# Patient Record
Sex: FEMALE | Race: WHITE
[De-identification: names, ages, dates, MRNs, and addresses within clinical notes are randomized per-mention and may not be internally consistent; named-entity substitution may affect disease eponyms.]

---

## 2018-08-20 ENCOUNTER — HOSPITAL ENCOUNTER (EMERGENCY)
Dept: HOSPITAL 45 - C.EDB | Age: 33
Discharge: HOME | End: 2018-08-20
Payer: COMMERCIAL

## 2018-08-20 VITALS — OXYGEN SATURATION: 97 %

## 2018-08-20 VITALS
WEIGHT: 293 LBS | BODY MASS INDEX: 44.41 KG/M2 | WEIGHT: 293 LBS | BODY MASS INDEX: 44.41 KG/M2 | HEIGHT: 67.99 IN | HEIGHT: 67.99 IN

## 2018-08-20 VITALS
TEMPERATURE: 97.52 F | OXYGEN SATURATION: 98 % | DIASTOLIC BLOOD PRESSURE: 89 MMHG | HEART RATE: 91 BPM | SYSTOLIC BLOOD PRESSURE: 152 MMHG

## 2018-08-20 DIAGNOSIS — Z96.41: ICD-10-CM

## 2018-08-20 DIAGNOSIS — R07.9: Primary | ICD-10-CM

## 2018-08-20 LAB
BASOPHILS # BLD: 0.02 K/UL (ref 0–0.2)
BASOPHILS NFR BLD: 0.2 %
BUN SERPL-MCNC: 10 MG/DL (ref 7–18)
CALCIUM SERPL-MCNC: 9 MG/DL (ref 8.5–10.1)
CO2 SERPL-SCNC: 25 MMOL/L (ref 21–32)
CREAT SERPL-MCNC: 0.65 MG/DL (ref 0.6–1.2)
EOS ABS #: 0.24 K/UL (ref 0–0.5)
EOSINOPHIL NFR BLD AUTO: 191 K/UL (ref 130–400)
GLUCOSE SERPL-MCNC: 98 MG/DL (ref 70–99)
HCT VFR BLD CALC: 42.9 % (ref 37–47)
HGB BLD-MCNC: 14.7 G/DL (ref 12–16)
IG#: 0.07 K/UL (ref 0–0.02)
IMM GRANULOCYTES NFR BLD AUTO: 18.8 %
LYMPHOCYTES # BLD: 1.73 K/UL (ref 1.2–3.4)
MCH RBC QN AUTO: 28.8 PG (ref 25–34)
MCHC RBC AUTO-ENTMCNC: 34.3 G/DL (ref 32–36)
MCV RBC AUTO: 84.1 FL (ref 80–100)
MONO ABS #: 0.58 K/UL (ref 0.11–0.59)
MONOCYTES NFR BLD: 6.3 %
NEUT ABS #: 6.57 K/UL (ref 1.4–6.5)
NEUTROPHILS # BLD AUTO: 2.6 %
NEUTROPHILS NFR BLD AUTO: 71.3 %
PMV BLD AUTO: 9.7 FL (ref 7.4–10.4)
POTASSIUM SERPL-SCNC: 3.6 MMOL/L (ref 3.5–5.1)
RED CELL DISTRIBUTION WIDTH CV: 13.3 % (ref 11.5–14.5)
RED CELL DISTRIBUTION WIDTH SD: 40.8 FL (ref 36.4–46.3)
SODIUM SERPL-SCNC: 140 MMOL/L (ref 136–145)
WBC # BLD AUTO: 9.21 K/UL (ref 4.8–10.8)

## 2018-08-20 NOTE — DIAGNOSTIC IMAGING REPORT
CHEST 2 VIEWS ROUTINE



CLINICAL HISTORY: 33 years-old Female presenting with cp. 



TECHNIQUE: PA and lateral views of the chest were obtained.



COMPARISON: None.



FINDINGS:

Cardiomediastinal silhouette normal. Lungs and pleural spaces clear. Osseous

structures normal. Upper abdomen normal.



IMPRESSION:

1.  No acute cardiopulmonary disease.







Electronically signed by:  Petr Franco M.D.

8/20/2018 6:20 PM



Dictated Date/Time:  8/20/2018 6:19 PM

## 2018-08-20 NOTE — EMERGENCY ROOM VISIT NOTE
History


Report prepared by Merrillibvanda:  Danielle D'Amico


Under the Supervision of:  Dr. Justin Ervin M.D.


First contact with patient:  17:18


Chief Complaint:  CHEST PAIN


Stated Complaint:  CHEST DISCOMFORT NOT FEELING WELL





History of Present Illness


The patient is a 33 year old female who presents to the Emergency Room with 

complaints of waxing and waning chest pain that onset today at 1530. She 

describes this as a dull pain in the center of the chest. The patient states 

that she was on her way home from work and started having chest discomfort. She 

notes that it subsided, but returned after she got home and ate food. She 

states that she was shaky, but notes that she did not eat lunch. She denies 

shortness of breath. The patient states that she has been drinking water 

throughout the day. The patient notes that her blood pressure was slightly up. 

The patient denies smoking, drug use, recent travel, traumas, and any chance of 

being pregnant. She also denies taking any birth control or estrogen creams. 

She states that she has a family of hypertension and heart disease.





   Source of History:  patient


   Onset:  today at 1530


   Position:  chest


   Quality:  dull


   Timing:  waxes/wanes


   Associated Symptoms:  No SOB


Note:


The patient complains of being shaky.





Review of Systems


See HPI for pertinent positives and negatives.  A total of ten systems were 

reviewed and were otherwise negative.





Family History





FHx: heart disease


FHx: hypertension





Social History


Smoking Status:  Never Smoker


Drug Use:  none





Allergies


Coded Allergies:  


     No Known Allergies (Unverified , 8/20/18)





Physical Exam


Vital Signs











  Date Time  Temp Pulse Resp B/P (MAP) Pulse Ox O2 Delivery O2 Flow Rate FiO2


 


8/20/18 18:14     97 Room Air  


 


8/20/18 18:14     97 Room Air  


 


8/20/18 18:13  89 16 153/97 98 Room Air  


 


8/20/18 17:34  96      


 


8/20/18 17:16 36.4 97 18 167/84 99 Room Air  











Physical Exam


Physical Exam 


GENERAL:  She is oriented to person, place, and time. She appears well-

developed and well-nourished. She does not appear distressed. There is a normal 

insulin pump in place.


HENT:  Exam performed. 


   Head:  Normocephalic and atraumatic. 


   Right Ear:  External ear normal. No mastoid tenderness. 


   Left Ear: External ear normal. No mastoid tenderness. 


   Mouth/Throat:  The oropharynx is clear and moist. No trismus in the jaw. No 

dental abscesses or uvula swelling. No oropharyngeal exudate or tonsillar 

abscesses.


EYES: Conjunctivae and EOM are normal. Pupils are equal, round, and reactive to 

light. Right eye exhibits no discharge. Left eye exhibits no discharge. No 

scleral icterus.


NECK: Normal range of motion. Neck supple. No JVD present. No spinous process 

tenderness present. No carotid bruit present. No rigidity. No tracheal 

deviation and normal range of motion present. No Brudzinski's sign and no Kernig

's sign noted.


CV: Normal rate, regular rhythm, normal heart sounds and intact distal pulses. 

There is no peripheral edema. Palpable radial pulses bue.


PULM/CHEST:  Effort normal and breath sounds normal. No respiratory distress. 

No stridor. She has no wheezes. She has no rales. 


   Chest Wall:  She exhibits no tenderness.


ABD: The abdomen is soft. Bowel sounds are normal. She has no distension. No 

mass is present. There is no tenderness. There is no rebound, no guarding, no 

Black's sign and no tenderness at McBurney's point. Rovsig negative


MUSC/SKEL: Normal range of motion. There is no peripheral edema, tenderness or 

deformity.


LYMPH: No cervical adenopathy.


NEURO: She is alert and oriented to person, place, and time. She has normal 

strength. No cranial nerve deficit or sensory deficit. Coordination and gait 

normal. GCS eye subscore is 4. GCS verbal subscore is 5. GCS motor subscore is 

6. Cerebellar tests wnl.


SKIN: Skin is warm and dry. She is not diaphoretic.


PSYCH: She has a normal mood and affect. Behavior is normal. Judgment and 

thought content normal.





Medical Decision & Procedures


ER Provider


Diagnostic Interpretation:


Radiology results as stated below per my review and radiologist interpretation: 





CHEST 2 VIEWS ROUTINE





CLINICAL HISTORY: 33 years-old Female presenting with cp. 





TECHNIQUE: PA and lateral views of the chest were obtained.





COMPARISON: None.





FINDINGS:


Cardiomediastinal silhouette normal. Lungs and pleural spaces clear. Osseous


structures normal. Upper abdomen normal.





IMPRESSION:


1.  No acute cardiopulmonary disease.











Electronically signed by:  Petr Franco M.D.


8/20/2018 6:20 PM





Dictated Date/Time:  8/20/2018 6:19 PM





Laboratory Results


8/20/18 17:50








Red Blood Count 5.10, Mean Corpuscular Volume 84.1, Mean Corpuscular Hemoglobin 

28.8, Mean Corpuscular Hemoglobin Concent 34.3, Mean Platelet Volume 9.7, 

Neutrophils (%) (Auto) 71.3, Lymphocytes (%) (Auto) 18.8, Monocytes (%) (Auto) 

6.3, Eosinophils (%) (Auto) 2.6, Basophils (%) (Auto) 0.2, Neutrophils # (Auto) 

6.57, Lymphocytes # (Auto) 1.73, Monocytes # (Auto) 0.58, Eosinophils # (Auto) 

0.24, Basophils # (Auto) 0.02





8/20/18 17:50

















Test


  8/20/18


17:50 8/20/18


20:58


 


White Blood Count


  9.21 K/uL


(4.8-10.8) 


 


 


Red Blood Count


  5.10 M/uL


(4.2-5.4) 


 


 


Hemoglobin


  14.7 g/dL


(12.0-16.0) 


 


 


Hematocrit 42.9 % (37-47)  


 


Mean Corpuscular Volume


  84.1 fL


() 


 


 


Mean Corpuscular Hemoglobin


  28.8 pg


(25-34) 


 


 


Mean Corpuscular Hemoglobin


Concent 34.3 g/dl


(32-36) 


 


 


Platelet Count


  191 K/uL


(130-400) 


 


 


Mean Platelet Volume


  9.7 fL


(7.4-10.4) 


 


 


Neutrophils (%) (Auto) 71.3 %  


 


Lymphocytes (%) (Auto) 18.8 %  


 


Monocytes (%) (Auto) 6.3 %  


 


Eosinophils (%) (Auto) 2.6 %  


 


Basophils (%) (Auto) 0.2 %  


 


Neutrophils # (Auto)


  6.57 K/uL


(1.4-6.5) 


 


 


Lymphocytes # (Auto)


  1.73 K/uL


(1.2-3.4) 


 


 


Monocytes # (Auto)


  0.58 K/uL


(0.11-0.59) 


 


 


Eosinophils # (Auto)


  0.24 K/uL


(0-0.5) 


 


 


Basophils # (Auto)


  0.02 K/uL


(0-0.2) 


 


 


RDW Standard Deviation


  40.8 fL


(36.4-46.3) 


 


 


RDW Coefficient of Variation


  13.3 %


(11.5-14.5) 


 


 


Immature Granulocyte % (Auto) 0.8 %  


 


Immature Granulocyte # (Auto)


  0.07 K/uL


(0.00-0.02) 


 


 


D-Dimer


  720 ug/L FEU


(0-500) 


 


 


Anion Gap


  8.0 mmol/L


(3-11) 


 


 


Est Creatinine Clear Calc


Drug Dose 187.2 ml/min 


  


 


 


Estimated GFR (


American) 135.2 


  


 


 


Estimated GFR (Non-


American 116.7 


  


 


 


BUN/Creatinine Ratio 15.5 (10-20)  


 


Calcium Level


  9.0 mg/dl


(8.5-10.1) 


 


 


Troponin I


  


  < 0.015 ng/ml


(0-0.045)





Laboratory results reviewed by me





Medications Administered











 Medications


  (Trade)  Dose


 Ordered  Sig/Sy


 Route  Start Time


 Stop Time Status Last Admin


Dose Admin


 


 Aspirin


  (Aspirin Chew)  324 mg  NOW  STAT


 PO  8/20/18 17:34


 8/20/18 17:35 DC 8/20/18 17:51


324 MG











ECG Per My Interpretation


Rate (beats per minute):  96


Rhythm:  sinus rhythm


Findings:  other (IA, QR, and QTC within normal limits. No ST segment elevation 

or depression. P waveinversion in lead 3 only. )





ED Course


1719: The patient was evaluated in room A10. A complete history and physical 

exam was performed.





1734: Ordered Aspirin 324 mg PO.





2042: Vital signs stable.  Physical exam within normal limits.  Patient states 

she has no chest pain at this time.  CTA of chest is negative. Will conduct 

troponin. If it is negative, she will be discharged. Her and her family member 

at her beside are in agreement of plan. 





2139: Vital signs stable.  Repeat troponin negative.  Patient will be 

discharged to follow-up with her PCP. DISCHARGE - Plan of care discussed with 

patient and questions answered. The patient was given both verbal and printed 

discharge instructions. The patient verbalized understanding and ability to 

comply. The patient is to seek outpatient follow up as noted in the discharge 

instructions. The patient verbalized understanding and ability to comply. The 

patient is discharged in stable condition. The patient was instructed to return 

for worsening symptoms.





Medical Decision


1719: The patient was evaluated in room A10. A complete history and physical 

exam was performed.





1734: Ordered Aspirin 324 mg PO.





2042: Vital signs stable.  Physical exam within normal limits.  Patient states 

she has no chest pain at this time.  CTA of chest is negative. Will conduct 

troponin. If it is negative, she will be discharged. Her and her family member 

at her beside are in agreement of plan. 





2139: Vital signs stable.  Repeat troponin negative.  Patient will be 

discharged to follow-up with her PCP. DISCHARGE - Plan of care discussed with 

patient and questions answered. The patient was given both verbal and printed 

discharge instructions. The patient verbalized understanding and ability to 

comply. The patient is to seek outpatient follow up as noted in the discharge 

instructions. The patient verbalized understanding and ability to comply. The 

patient is discharged in stable condition. The patient was instructed to return 

for worsening symptoms.





Medication Reconcilliation


Current Medication List:  was personally reviewed by me





Impression





 Primary Impression:  


 Chest pain





Scribe Attestation


The scribe's documentation has been prepared under my direction and personally 

reviewed by me in its entirety. I confirm that the note above accurately 

reflects all work, treatment, procedures, and medical decision making performed 

by me.





The chart was completed utilizing Dragon Speech voice recognition software. 

Grammatical errors, random word insertions, pronoun errors, and incomplete 

sentences are an occasional consequence of this system due to software 

limitations, ambient noise, and hardware issues. Any formal questions or 

concerns about the content, text, or information contained within the body of 

this dictation should be directly addressed to the physician for clarification.





Departure Information


Patient Instructions


My [facility]





Problem Qualifiers








 Primary Impression:  


 Chest pain


 Chest pain type:  unspecified  Qualified Codes:  R07.9 - Chest pain, 

unspecified

## 2018-08-20 NOTE — DIAGNOSTIC IMAGING REPORT
CT ANGIOGRAPHY OF THE CHEST, PULMONARY EMBOLUS PROTOCOL



CLINICAL HISTORY: Chest discomfort.    



COMPARISON STUDY:  Chest radiograph August 20, 2018. 



TECHNIQUE: Following IV administration of 93 mL of Optiray-320, helical axial

images of the chest were obtained utilizing the pulmonary embolus protocol. 

Maximal intensity projections and sagittal and coronal reformats were viewed on

an independent 3D workstation.  IV contrast was administered without

complication.  A dose lowering technique was utilized adhering to the principles

of ALARA.





CT DOSE: 702.29 mGy.cm



FINDINGS:  No pulmonary emboli are identified although the segmental and

subsegmental pulmonary arteries are suboptimally assessed due to respiratory

motion and quantum mottle artifact. Size of the heart is at the upper limits of

normal. There is no thoracic aortic dissection. There is no pericardial

effusion. No pneumothorax or pleural effusion is noted. There is no

consolidation to suggest pneumonia. There are mild groundglass opacities with

mosaic attenuation. Bony thorax is unremarkable. Gallbladder is surgically

absent. There is probable fatty infiltration of the liver.



IMPRESSION:  



1. No pulmonary emboli identified although segmental and subsegmental pulmonary

arteries suboptimally assessed.



2. Mild groundglass opacities within the lungs with apparent mosaic attenuation.

This likely reflects atelectasis however air trapping could appear similar. No

consolidation.



3. Probable fatty infiltration of the liver.







Electronically signed by:  Barrie Echevarria M.D.

8/20/2018 7:54 PM



Dictated Date/Time:  8/20/2018 7:48 PM